# Patient Record
Sex: FEMALE | Race: WHITE | NOT HISPANIC OR LATINO | Employment: FULL TIME | URBAN - METROPOLITAN AREA
[De-identification: names, ages, dates, MRNs, and addresses within clinical notes are randomized per-mention and may not be internally consistent; named-entity substitution may affect disease eponyms.]

---

## 2021-06-01 ENCOUNTER — RECORDS - HEALTHEAST (OUTPATIENT)
Dept: ADMINISTRATIVE | Facility: CLINIC | Age: 26
End: 2021-06-01

## 2021-09-06 ENCOUNTER — HOSPITAL ENCOUNTER (OUTPATIENT)
Facility: CLINIC | Age: 26
End: 2021-09-06
Admitting: FAMILY MEDICINE
Payer: COMMERCIAL

## 2021-09-06 ENCOUNTER — HOSPITAL ENCOUNTER (OUTPATIENT)
Facility: CLINIC | Age: 26
Discharge: HOME | End: 2021-09-06
Attending: FAMILY MEDICINE | Admitting: FAMILY MEDICINE
Payer: COMMERCIAL

## 2021-09-06 VITALS — RESPIRATION RATE: 16 BRPM | TEMPERATURE: 98.2 F | SYSTOLIC BLOOD PRESSURE: 137 MMHG | DIASTOLIC BLOOD PRESSURE: 88 MMHG

## 2021-09-06 LAB
ALBUMIN UR-MCNC: NEGATIVE MG/DL
APPEARANCE UR: CLEAR
BILIRUB UR QL STRIP: NEGATIVE
CLUE CELLS: ABNORMAL
COLOR UR AUTO: YELLOW
GLUCOSE UR STRIP-MCNC: NEGATIVE MG/DL
HGB UR QL STRIP: NEGATIVE
KETONES UR STRIP-MCNC: NEGATIVE MG/DL
LEUKOCYTE ESTERASE UR QL STRIP: NEGATIVE
MUCOUS THREADS #/AREA URNS LPF: PRESENT /LPF
NITRATE UR QL: NEGATIVE
PH UR STRIP: 5.5 [PH] (ref 5–7)
RBC URINE: <1 /HPF
RUPTURE OF FETAL MEMBRANES BY ROM PLUS: NEGATIVE
SP GR UR STRIP: 1.02 (ref 1–1.03)
SQUAMOUS EPITHELIAL: <1 /HPF
TRICHOMONAS, WET PREP: ABNORMAL
UROBILINOGEN UR STRIP-MCNC: <2 MG/DL
WBC URINE: 1 /HPF
WBC'S/HIGH POWER FIELD, WET PREP: ABNORMAL
YEAST, WET PREP: ABNORMAL

## 2021-09-06 PROCEDURE — 81001 URINALYSIS AUTO W/SCOPE: CPT

## 2021-09-06 PROCEDURE — 84112 EVAL AMNIOTIC FLUID PROTEIN: CPT

## 2021-09-06 PROCEDURE — 87210 SMEAR WET MOUNT SALINE/INK: CPT

## 2021-09-06 PROCEDURE — G0463 HOSPITAL OUTPT CLINIC VISIT: HCPCS

## 2021-09-06 RX ORDER — PRENATAL VIT/IRON FUM/FOLIC AC 27MG-0.8MG
1 TABLET ORAL DAILY
COMMUNITY

## 2021-09-06 RX ORDER — VALACYCLOVIR HYDROCHLORIDE 500 MG/1
500 TABLET, FILM COATED ORAL DAILY
COMMUNITY

## 2021-09-06 NOTE — PROGRESS NOTES
"Pt to [facility] [place] with complaints of increased vaginal discharge x1 week. Pt states most days she is waking up with \"extra discharge\" in her underwear. Pt denies any vaginal bleed. States she is able to feel some fetal movement but nothing consistent.   "

## 2021-09-06 NOTE — PROGRESS NOTES
Labs all WNL. Discharge instructions reviewed with patient and partner. Both decline any further questions/concerns. Pt discharged home.

## 2021-09-06 NOTE — PROGRESS NOTES
"OB Triage Note        Assessment and Plan:     Karlee Craig is a 26 year old  at 20w1d with a recent history of shingles treated with prednisone coming in due to 1.5 weeks of increased discharge. Considered infection vs ROM. ROM+ was negative, UA and wet prep negative. Good FHR and movement. Recent US showing normal AF, all reassuring. Discussed with Dr. Barry and patient and appropriate to discharge at this time that the change in discharge is likely benign, but to follow-up with her primary care doctor in 1 week.   There is no problem list on file for this patient.    -Discharge to home  -Follow-up with PCP outpatient in 1 week.     Patient discussed with attending physician, Dr. Barry , who agrees with the plan.      Divya Ortiz MD PGY1 2021  [facility]       Subjective:     Karlee Craig is a  26 year old female at 20w1d with a current prenatal history significant for shingles recently treated with prednisone who presents to OB triage with increased discharge for 1.5weeks. Did go to 20w US after noticing the discharge and was told her AF was normal. Discharge was noted initially increased as clear fluid and stringy, though has decreased since the first few days. Has felt some \"fluttering\" of fetal movement. No blood, odor, or pain noticed. No trauma. No dysuria, no fevers.     Karlee Craig is a patient of Anne-Marie Knight.     Estimated Date of Delivery: 2022 No LMP recorded. Patient is pregnant.       Her prenatal course has been uncomplicated .    Prenatal labs:   No results found for: ABO, RH, AS, HEPBANG, CHPCRT, GCPCRT, TREPAB, RUBELLAABIGG, HGB, HIV, GBS       Review of Systems:   SKIN: no worrisome rashes or lesions  EYES: no acute vision problems or changes  ENT: no ear problems, no mouth problems, no throat problems  RESP: no significant cough, no shortness of breath  CV: no chest pain, no " "palpitations, no new or worsening peripheral edema  GI: no nausea, no vomiting, no constipation, no diarrhea  : no frequency, no dysuria, no hematuria  NEURO: no weakness, no dizziness, no headaches         Physical Exam:   Vitals:   Temp 98.2  F (36.8  C) (Oral)   Resp 16   0 lbs 0 oz  Estimated body mass index is 36.59 kg/m  as calculated from the following:    Height as of 6/12/21: 1.778 m (5' 10\").    Weight as of 6/12/21: 115.7 kg (255 lb).    GEN: Awake, alert in no apparent distress   HEENT: grossly normal  RESPIRATORY: clear to auscultation bilaterally, no increased work of breathing  BACK:  no costovertebral angle tenderness   CARDIOVASCULAR: RRR, no murmur  ABDOMEN: gravid, soft, nontender to palpation  PELVIC:  no fluid noted, no blood noted.   EXT:  no edema or calf tenderness    Fetal doppler:   [phone]      Labs today:  Results for orders placed or performed during the hospital encounter of 09/06/21   Rupture of Fetal Membranes by ROM Plus     Status: Normal   Result Value Ref Range    Rupture of Fetal Membranes by ROM Plus Negative Negative, Invalid, Suggest Repeat    Narrative    It is recommended that the tests to detect rupture of the amniotic membranes should not be used without other clinical assessments to make clinical patient management decision.   UA with Microscopic reflex to Culture     Status: Abnormal    Specimen: Urine, Midstream   Result Value Ref Range    Color Urine Yellow Colorless, Straw, Light Yellow, Yellow    Appearance Urine Clear Clear    Glucose Urine Negative Negative mg/dL    Bilirubin Urine Negative Negative    Ketones Urine Negative Negative mg/dL    Specific Gravity Urine 1.024 1.001 - 1.030    Blood Urine Negative Negative    pH Urine 5.5 5.0 - 7.0    Protein Albumin Urine Negative Negative mg/dL    Urobilinogen Urine <2.0 <2.0 mg/dL    Nitrite Urine Negative Negative    Leukocyte Esterase Urine Negative Negative    Mucus Urine Present (A) None Seen /LPF    RBC " Urine <1 <=2 /HPF    WBC Urine 1 <=5 /HPF    Squamous Epithelials Urine <1 <=1 /HPF    Narrative    Urine Culture not indicated   Wet preparation     Status: Abnormal    Specimen: Vagina; Swab   Result Value Ref Range    Trichomonas Absent Absent    Yeast Absent Absent    Clue Cells Absent Absent    WBCs/high power field 1+ (A) None